# Patient Record
Sex: MALE | Race: WHITE
[De-identification: names, ages, dates, MRNs, and addresses within clinical notes are randomized per-mention and may not be internally consistent; named-entity substitution may affect disease eponyms.]

---

## 2018-01-14 ENCOUNTER — HOSPITAL ENCOUNTER (INPATIENT)
Dept: HOSPITAL 96 - M.ERS | Age: 48
LOS: 3 days | Discharge: HOME | DRG: 202 | End: 2018-01-17
Attending: INTERNAL MEDICINE | Admitting: INTERNAL MEDICINE
Payer: COMMERCIAL

## 2018-01-14 VITALS — WEIGHT: 315 LBS | HEIGHT: 72.99 IN | BODY MASS INDEX: 41.75 KG/M2

## 2018-01-14 VITALS — SYSTOLIC BLOOD PRESSURE: 209 MMHG | DIASTOLIC BLOOD PRESSURE: 119 MMHG

## 2018-01-14 VITALS — SYSTOLIC BLOOD PRESSURE: 146 MMHG | DIASTOLIC BLOOD PRESSURE: 93 MMHG

## 2018-01-14 VITALS — SYSTOLIC BLOOD PRESSURE: 151 MMHG | DIASTOLIC BLOOD PRESSURE: 84 MMHG

## 2018-01-14 DIAGNOSIS — Z28.21: ICD-10-CM

## 2018-01-14 DIAGNOSIS — F17.210: ICD-10-CM

## 2018-01-14 DIAGNOSIS — Z79.82: ICD-10-CM

## 2018-01-14 DIAGNOSIS — J20.8: Primary | ICD-10-CM

## 2018-01-14 DIAGNOSIS — E86.0: ICD-10-CM

## 2018-01-14 DIAGNOSIS — Z71.6: ICD-10-CM

## 2018-01-14 DIAGNOSIS — M79.1: ICD-10-CM

## 2018-01-14 DIAGNOSIS — J18.9: ICD-10-CM

## 2018-01-14 DIAGNOSIS — I10: ICD-10-CM

## 2018-01-14 DIAGNOSIS — Z79.899: ICD-10-CM

## 2018-01-14 LAB
ABSOLUTE BASOPHILS: 0 THOU/UL (ref 0–0.2)
ABSOLUTE EOSINOPHILS: 0.1 THOU/UL (ref 0–0.7)
ABSOLUTE MONOCYTES: 0.8 THOU/UL (ref 0–1.2)
ALBUMIN SERPL-MCNC: 4 G/DL (ref 3.4–5)
ALP SERPL-CCNC: 123 U/L (ref 46–116)
ALT SERPL-CCNC: 37 U/L (ref 30–65)
ANION GAP SERPL CALC-SCNC: 11 MMOL/L (ref 7–16)
AST SERPL-CCNC: 25 U/L (ref 15–37)
BASOPHILS NFR BLD AUTO: 0.7 %
BILIRUB SERPL-MCNC: 0.5 MG/DL
BILIRUB UR-MCNC: NEGATIVE MG/DL
BUN SERPL-MCNC: 11 MG/DL (ref 7–18)
CALCIUM SERPL-MCNC: 8.7 MG/DL (ref 8.5–10.1)
CHLORIDE SERPL-SCNC: 103 MMOL/L (ref 98–107)
CO2 SERPL-SCNC: 24 MMOL/L (ref 21–32)
COLOR UR: YELLOW
CREAT SERPL-MCNC: 1.2 MG/DL (ref 0.6–1.3)
EOSINOPHIL NFR BLD: 1 %
GLUCOSE SERPL-MCNC: 101 MG/DL (ref 70–99)
GRANULOCYTES NFR BLD MANUAL: 74.5 %
HCT VFR BLD CALC: 43.4 % (ref 42–52)
HGB BLD-MCNC: 14.8 GM/DL (ref 14–18)
KETONES UR STRIP-MCNC: NEGATIVE MG/DL
LIPASE: 89 U/L (ref 73–393)
LYMPHOCYTES # BLD: 0.6 THOU/UL (ref 0.8–5.3)
LYMPHOCYTES NFR BLD AUTO: 10.6 %
MCH RBC QN AUTO: 28.5 PG (ref 26–34)
MCHC RBC AUTO-ENTMCNC: 34.2 G/DL (ref 28–37)
MCV RBC: 83.4 FL (ref 80–100)
MONOCYTES NFR BLD: 13.2 %
MPV: 8.1 FL. (ref 7.2–11.1)
NEUTROPHILS # BLD: 4.5 THOU/UL (ref 1.6–8.1)
NITRITE UR QL STRIP: NEGATIVE
NUCLEATED RBCS: 0 /100WBC
PLATELET COUNT*: 187 THOU/UL (ref 150–400)
POTASSIUM SERPL-SCNC: 3.5 MMOL/L (ref 3.5–5.1)
PROT SERPL-MCNC: 8 G/DL (ref 6.4–8.2)
PROT UR QL STRIP: NEGATIVE
RBC # BLD AUTO: 5.2 MIL/UL (ref 4.5–6)
RBC # UR STRIP: (no result) /UL
RDW-CV: 14.7 % (ref 10.5–14.5)
SODIUM SERPL-SCNC: 138 MMOL/L (ref 136–145)
SP GR UR STRIP: <= 1.005 (ref 1–1.03)
TROPONIN-I LEVEL: <0.06 NG/ML (ref ?–0.06)
URINE CLARITY: CLEAR
URINE GLUCOSE-RANDOM: NEGATIVE
URINE LEUKOCYTES: NEGATIVE
UROBILINOGEN UR STRIP-ACNC: 0.2 E.U./DL (ref 0.2–1)
WBC # BLD AUTO: 6.1 THOU/UL (ref 4–11)

## 2018-01-15 VITALS — DIASTOLIC BLOOD PRESSURE: 110 MMHG | SYSTOLIC BLOOD PRESSURE: 219 MMHG

## 2018-01-15 VITALS — DIASTOLIC BLOOD PRESSURE: 79 MMHG | SYSTOLIC BLOOD PRESSURE: 144 MMHG

## 2018-01-15 VITALS — DIASTOLIC BLOOD PRESSURE: 94 MMHG | SYSTOLIC BLOOD PRESSURE: 181 MMHG

## 2018-01-15 VITALS — SYSTOLIC BLOOD PRESSURE: 194 MMHG | DIASTOLIC BLOOD PRESSURE: 103 MMHG

## 2018-01-15 VITALS — SYSTOLIC BLOOD PRESSURE: 193 MMHG | DIASTOLIC BLOOD PRESSURE: 119 MMHG

## 2018-01-15 NOTE — NUR
AM ASSESSMENT AND VITAL SIGNS COMPLETED AS DOCUMENTED. PT HAS RESTED
INTERMITTENTLY. IV FLUID CONTINUES ALONG WITH SYMPTOM MANAGEMENT. BLOOD
PRESSURE HAS BEEN ELEVATED, PRN HYDRALAZINE GIVEN TWICE WITH SOME IMPROVEMENT.
PT'S APPETITE AND ORAL FLUID INTAKE HAVE NOT BEEN AFFECTED. PT STATES HE
HASN'T HAD A BOWEL MOVEMENT IN TWO DAYS, PRUNE JUICE GIVEN PER HIS REQUEST.
HOURLY ROUNDING AND FALL PRECAUTIONS IN PLACE.

## 2018-01-15 NOTE — NUR
PATIENT ARRIVED ON FLOOR FROM ER ABOUT 2200. PATIENT ADMISSION HISTORY AND
ASSESSMENT WAS COMPLETED CHARTED. PATIENT WAS GIVEN PAIN MEDICINE AS NEEDED
FOR GENERALIZED BODY ACHES WITH SOME RELIEF. WILL CONTINUE TO MONITOR.

## 2018-01-15 NOTE — EKG
[place] [address]
Phone:  [phone]                     ELECTROCARDIOGRAM REPORT      
_______________________________________________________________________________
 
Name:       HUNTER BACA                  Room:           [address]    ADM IN  
M.R.#:  [id number]      Account #:      [id number]  
Admission:  18     Attend Phys:    HODA Redding
Discharge:               Date of Birth:  [birth year]  
         Report #: [id number]
    [id number]
_______________________________________________________________________________
THIS REPORT FOR:  //name//                      
 
                         [facility] ED
                                       
Test Date:    2018               Test Time:    18:12:27
Pat Name:     HUNTER BACA            Department:   
Patient ID:   [id number]            Room:         [address]
Gender:       M                        Technician:   AL
:          1970               Requested By: Charlotte Trujillo
Order Number: [id number]    Johnnie MD:   Fidel Osei
                                 Measurements
Intervals                              Axis          
Rate:         114                      P:            29
UT:           156                      QRS:          -3
QRSD:         108                      T:            70
QT:           355                                    
QTc:          489                                    
                           Interpretive Statements
Sinus tachycardia
Minimal ST depression, lateral leads
Borderline prolonged QT interval
No previous ECG available for comparison
 
Electronically Signed On 1- 16:29:54 CST by Fidel Osei
[email or web address]
 
 
 
 
 
 
 
 
 
 
 
 
 
 
 
 
 
 
  <ELECTRONICALLY SIGNED>
                                           By: Fidel Osei MD, [facility]     
  01/15/18     1629
D: [id number]   _____________________________________
T: 18   Fidel Osei MD, FACC       /EPI

## 2018-01-16 VITALS — DIASTOLIC BLOOD PRESSURE: 104 MMHG | SYSTOLIC BLOOD PRESSURE: 195 MMHG

## 2018-01-16 VITALS — SYSTOLIC BLOOD PRESSURE: 160 MMHG | DIASTOLIC BLOOD PRESSURE: 82 MMHG

## 2018-01-16 VITALS — SYSTOLIC BLOOD PRESSURE: 149 MMHG | DIASTOLIC BLOOD PRESSURE: 81 MMHG

## 2018-01-16 VITALS — DIASTOLIC BLOOD PRESSURE: 74 MMHG | SYSTOLIC BLOOD PRESSURE: 159 MMHG

## 2018-01-16 VITALS — SYSTOLIC BLOOD PRESSURE: 172 MMHG | DIASTOLIC BLOOD PRESSURE: 87 MMHG

## 2018-01-16 VITALS — DIASTOLIC BLOOD PRESSURE: 70 MMHG | SYSTOLIC BLOOD PRESSURE: 152 MMHG

## 2018-01-16 VITALS — SYSTOLIC BLOOD PRESSURE: 185 MMHG | DIASTOLIC BLOOD PRESSURE: 105 MMHG

## 2018-01-16 LAB
ABSOLUTE MONOCYTES: 0.3 THOU/UL (ref 0–1.2)
ANION GAP SERPL CALC-SCNC: 11 MMOL/L (ref 7–16)
ANISOCYTOSIS BLD QL SMEAR: (no result)
BUN SERPL-MCNC: 18 MG/DL (ref 7–18)
CALCIUM SERPL-MCNC: 8.7 MG/DL (ref 8.5–10.1)
CHLORIDE SERPL-SCNC: 103 MMOL/L (ref 98–107)
CO2 SERPL-SCNC: 26 MMOL/L (ref 21–32)
CREAT SERPL-MCNC: 1.2 MG/DL (ref 0.6–1.3)
GLUCOSE SERPL-MCNC: 198 MG/DL (ref 70–99)
GRANULOCYTES NFR BLD MANUAL: 91 %
HCT VFR BLD CALC: 44.1 % (ref 42–52)
HGB BLD-MCNC: 14.3 GM/DL (ref 14–18)
LYMPHOCYTES # BLD: 0.8 THOU/UL (ref 0.8–5.3)
LYMPHOCYTES NFR BLD AUTO: 6 %
MCH RBC QN AUTO: 27.3 PG (ref 26–34)
MCHC RBC AUTO-ENTMCNC: 32.5 G/DL (ref 28–37)
MCV RBC: 84.2 FL (ref 80–100)
MONOCYTES NFR BLD: 2 %
MPV: 9 FL. (ref 7.2–11.1)
NEUTROPHILS # BLD: 12.4 THOU/UL (ref 1.6–8.1)
NEUTS BAND NFR BLD: 1 %
NUCLEATED RBCS: 0 /100WBC
PLATELET # BLD EST: ADEQUATE 10*3/UL
PLATELET COUNT*: 220 THOU/UL (ref 150–400)
POIKILOCYTOSIS BLD QL SMEAR: (no result)
POTASSIUM SERPL-SCNC: 4.2 MMOL/L (ref 3.5–5.1)
RBC # BLD AUTO: 5.23 MIL/UL (ref 4.5–6)
RDW-CV: 14.8 % (ref 10.5–14.5)
SODIUM SERPL-SCNC: 140 MMOL/L (ref 136–145)
WBC # BLD AUTO: 13.5 THOU/UL (ref 4–11)

## 2018-01-16 NOTE — NUR
PT SLEPT ON AND OFF OVERNIGHT, SLEPT FOR A FEW HOURS AFTER RECEIVING PO PAIN
AND ANXIETY MED AT HS. BP ELEVATED THIS SHIFT, SCHEDULED PO MEDS AND PRN IV
HYDRALAZINE GIVEN AS INDICATED AND BP IMPROVED THIS MORNING. TO HAVE CARDIAC
ECHO TODAY. TELE . CO GENERALIZED ACHES AND PAINS, NO FEVER OVERNIGHT. UP
INDEP TO BR TO VOID AND BM OVERNIGHT, SOME EDGE. TOLERATING REGULAR DIET
WITHOUT N/V. NICOTINE PATCH REMOVED AT HS. CXR TODAY. AM LABS DRAWN. UP INDEP
TO SHOWER THIS MORNING, IV PULLED OUT. RFA IV RESTARTED AND IVF INFUSING PER
PUMP. OCC PROD COUGH, BRITT SALIVA/SPUTUM NOT SUITABLE FOR SPECIMEN COLLECTION.
ABLE TO USE CALL LITE AND MAKE NEEDS KNOWN.

## 2018-01-16 NOTE — 2DMMODE
[place] [address]
Phone:  [phone] 2 D/M-MODE ECHOCARDIOGRAM     
_______________________________________________________________________________
 
Name:         HUNTER BACA                 Room:          [address]    ADM IN 
[facility]#:    [id number]     Account #:     [id number]  
Admission:    18    Attend Phys:   Kang Lopez
Discharge:                Date of Birth: [birth year]  
Date of Service: 18 1704  Report #:      [id number]
        [id number]
_______________________________________________________________________________
THIS REPORT FOR:  //name//                      
 
 
--------------- APPROVED REPORT --------------
 
 
Study performed:  2018 11:30:54
 
EXAM: Comprehensive 2D, Doppler, and color-flow 
Echocardiogram 
Patient Location: In-Patient   
Room #:  [facility]     
 
      BSA:         2.67
HR: 89 bpm BP:          152/70 mmHg 
 
Other Information 
Study Quality: Good
 
Indications
Congestive Heart Failure
 
2D Dimensions
   LVEF(%):  70.82 (&gt;50%)
IVSd:  17.70 (7-11mm) LVOT Diam:  22.08 (18-24mm) 
LVDd:  50.61 mm  
PWd:  15.04 (7-11mm) Ascending Ao:  32.43 (22-36mm)
LVDs:  30.17 (25-40mm) 
Aortic Root:  31.46 mm 
   Dueñas's LVEF:  70.82 %
 
Volumes
Left Atrial Volume (Systole) 
    LA ESV Index:  31.90 mL/m2
 
Aortic Valve
AoV Peak Rex.:  1.63 m/s 
AO Peak Gr.:  10.57 mmHg LVOT Max P.49 mmHg
AO Mean Gr.:  6.07 mmHg  LVOT Mean PG:  3.32 mmHg
    LVOT Max V:  1.27 m/s
AO V2 VTI:  28.77 cm  LVOT Mean V:  0.85 m/s
CAMI (VTI):  3.29 cm2  LVOT V1 VTI:  24.68 cm
 
Mitral Valve
    E/A Ratio:  0.84
    MV Decel. Time:  227.91 ms
 
 
[place] [address]
Phone:  [phone]                     2 D/M-MODE ECHOCARDIOGRAM     
_______________________________________________________________________________
 
Name:         HUNTER BACA                 Room:          [address] [place] 
.R.#:    [id number]     Account #:     [id number]  
Admission:    18    Attend Phys:   Kang Lopez
Discharge:                Date of Birth: [birth year]  
Date of Service: 18 1704  Report #:      [id number]
        [id number]
_______________________________________________________________________________
MV E Max Rex.:  0.77 m/s 
MV PHT:  66.09 ms  
MVA (PHT):  3.33 cm2  
 
TDI
E/Lateral E':  7.70 E/Medial E':  9.63
   Medial E' Rex.:  0.08 m/s
   Lateral E' Rex.:  0.10 m/s
 
Pulmonary Valve
PV Peak Rex.:  1.00 m/s PV Peak Gr.:  3.97 mmHg
 
Left Ventricle
The left ventricle is normal size. There is normal LV segmental wall 
motion. Moderate concentric left ventricular hypertrophy. Left 
ventricular systolic function is normal. The left ventricular 
ejection fraction is within the normal range. LVEF is 55%. Grade I - 
abnormal relaxation pattern.
 
Right Ventricle
The right ventricle is normal size. The right ventricular systolic 
function is normal.
 
Atria
The left atrium size is normal. The right atrium size is 
normal.
 
Aortic Valve
The aortic valve is normal in structure. No aortic regurgitation is 
present. There is no aortic valvular stenosis.
 
Mitral Valve
The mitral valve is normal in structure. Mild mitral regurgitation. 
No evidence of mitral valve stenosis.
 
Tricuspid Valve
The tricuspid valve is normal in structure. There is no tricuspid 
valve regurgitation noted.
 
Pulmonic Valve
The pulmonary valve is normal in structure. There is no pulmonic 
valvular regurgitation.
 
Great Vessels
The aortic root is normal in size. IVC is normal in size and 
collapses with &gt;50% inspiration
 
 
[place] [address]
Phone:  [phone]                     2 D/M-MODE ECHOCARDIOGRAM     
_______________________________________________________________________________
 
Name:         HUNTER BACA                 Room:          [address] [place] 
[facility]#:    [id number]     Account #:     [id number]  
Admission:    18    Attend Phys:   Kang Lopez
Discharge:                Date of Birth: [birth year]  
Date of Service: 18 1704  Report #:      [id number]
        [id number]
_______________________________________________________________________________
 
Pericardium
There is no pericardial effusion.
 
&lt;Conclusion&gt;
The left ventricle is normal size.
Moderate concentric left ventricular hypertrophy.
Left ventricular systolic function is normal.
The left ventricular ejection fraction is within the normal 
range.
LVEF is 55%.
Grade I - abnormal relaxation pattern.
The right ventricle is normal size.
The left atrium size is normal.
The aortic valve is normal in structure.
The mitral valve is normal in structure.
Mild mitral regurgitation.
The tricuspid valve is normal in structure.
IVC is normal in size and collapses with &gt;50% inspiration
There is no pericardial effusion.
There is normal LV segmental wall motion.
 
 
 
 
 
 
 
 
 
 
 
 
 
 
 
 
 
 
 
 
 
 
 
  <ELECTRONICALLY SIGNED>
                                           By: Fidel Osei MD, FACC     
  18     1704
D: 18 170   _____________________________________
T: 18 1704   Fidel Osei MD, FACC       /INF

## 2018-01-16 NOTE — NUR
PATIENT GIVEN PRN VICODIN X 2 THIS SHIFT FOR BACK PAIN. PATIENT REQUESTED
HEATING PAD FOR BACK, ORDER PLACED. IV SL THIS SHIFT, SCHED ABX GIVEN. ECHO
DONE THIS AM, AWAITING RESULTS. CXR THIS AM, DR. GEORGE AWARE OF RESULTS.
CARDAIC MONITOR REMAINS IN PLACE, SR/ST THIS SHIFT. PATIENT UP AD RUFUS AROUND
ROOM AND IN HALLWAY. HYDRALAZINE GIVEN X 1 THIS SHIFT FOR ELEVATED BP. PATIENT
HOPING TO GO HOME SOON.

## 2018-01-17 VITALS — SYSTOLIC BLOOD PRESSURE: 149 MMHG | DIASTOLIC BLOOD PRESSURE: 79 MMHG

## 2018-01-17 VITALS — DIASTOLIC BLOOD PRESSURE: 98 MMHG | SYSTOLIC BLOOD PRESSURE: 156 MMHG

## 2018-01-17 VITALS — DIASTOLIC BLOOD PRESSURE: 82 MMHG | SYSTOLIC BLOOD PRESSURE: 138 MMHG

## 2018-01-17 VITALS — SYSTOLIC BLOOD PRESSURE: 156 MMHG | DIASTOLIC BLOOD PRESSURE: 98 MMHG

## 2018-01-17 NOTE — NUR
Nutrition: Pt assessed for high BMI. Wt: 340#. Regular diet, eating well.
Admitted with pneumonia. RX, labs noted. Pt appears at low nutrition
risk at this time.

## 2018-01-17 NOTE — NUR
PT SLEPT AT INTERVALS DURING THE NIGHT, IV SALINE LOCKED, SNACKS AT HS, UP AD
RUFUS, PRN PAIN MEDICATIONS AT HS, CALL LIGHT IN REACH, WILL CONTINUE TO MONITOR

## 2018-01-17 NOTE — NUR
PATIENT UP AND AMBULATING INDPENDENTLY. IV DC'D THIS SHIFT AFTER SCHED ABX
GIVEN. PATIENT SCRIPTS CALLED INTO [facility] IN [place]. CAB CALLED
FOR PATIENT. PATIENT VERBALIZES UNDERSTANDING OF ALL DISCHARGE PAPERS. ALL
BELONGINGS SENT WITH PATIENT.